# Patient Record
Sex: FEMALE | Race: WHITE
[De-identification: names, ages, dates, MRNs, and addresses within clinical notes are randomized per-mention and may not be internally consistent; named-entity substitution may affect disease eponyms.]

---

## 2019-05-29 LAB
ADD MANUAL DIFF: NO
APPEARANCE UR: (no result)
APTT PPP: YELLOW S
BARBITURATES UR QL SCN: NEGATIVE
BASOPHILS # BLD AUTO: 0 10^3/UL (ref 0–0.2)
BASOPHILS NFR BLD AUTO: 0.3 % (ref 0–2)
BILIRUB UR QL STRIP: NEGATIVE
EOSINOPHIL # BLD AUTO: 0 10^3/UL (ref 0–0.6)
EOSINOPHIL NFR BLD AUTO: 0.1 % (ref 0–6)
ERYTHROCYTE [DISTWIDTH] IN BLOOD BY AUTOMATED COUNT: 15.8 % (ref 11.5–14)
GLUCOSE UR STRIP-MCNC: NEGATIVE MG/DL
HCT VFR BLD CALC: 33.1 % (ref 36–47)
HGB BLD-MCNC: 11 G/DL (ref 12–15.5)
KETONES UR STRIP-MCNC: NEGATIVE MG/DL
LYMPHOCYTES # BLD AUTO: 1.7 10^3/UL (ref 0.5–4.7)
LYMPHOCYTES NFR BLD AUTO: 16.3 % (ref 13–45)
MCH RBC QN AUTO: 28.6 PG (ref 27–33.4)
MCHC RBC AUTO-ENTMCNC: 33.2 G/DL (ref 32–36)
MCV RBC AUTO: 86 FL (ref 80–97)
METHADONE UR QL SCN: NEGATIVE
MONOCYTES # BLD AUTO: 0.6 10^3/UL (ref 0.1–1.4)
MONOCYTES NFR BLD AUTO: 5.4 % (ref 3–13)
NEUTROPHILS # BLD AUTO: 8.1 10^3/UL (ref 1.7–8.2)
NEUTS SEG NFR BLD AUTO: 77.9 % (ref 42–78)
NITRITE UR QL STRIP: NEGATIVE
PCP UR QL SCN: NEGATIVE
PH UR STRIP: 6 [PH] (ref 5–9)
PLATELET # BLD: 320 10^3/UL (ref 150–450)
PROT UR STRIP-MCNC: NEGATIVE MG/DL
RBC # BLD AUTO: 3.83 10^6/UL (ref 3.72–5.28)
SP GR UR STRIP: 1.02
TOTAL CELLS COUNTED % (AUTO): 100 %
URINE AMPHETAMINES SCREEN: NEGATIVE
URINE BENZODIAZEPINES SCREEN: NEGATIVE
URINE COCAINE SCREEN: NEGATIVE
URINE MARIJUANA (THC) SCREEN: NEGATIVE
UROBILINOGEN UR-MCNC: NEGATIVE MG/DL (ref ?–2)
WBC # BLD AUTO: 10.4 10^3/UL (ref 4–10.5)

## 2019-05-30 ENCOUNTER — HOSPITAL ENCOUNTER (INPATIENT)
Dept: HOSPITAL 62 - 2N | Age: 19
LOS: 2 days | Discharge: HOME | End: 2019-06-01
Attending: OBSTETRICS & GYNECOLOGY | Admitting: OBSTETRICS & GYNECOLOGY
Payer: OTHER GOVERNMENT

## 2019-05-30 DIAGNOSIS — Z3A.39: ICD-10-CM

## 2019-05-30 DIAGNOSIS — O34.211: Primary | ICD-10-CM

## 2019-05-30 DIAGNOSIS — Z23: ICD-10-CM

## 2019-05-30 PROCEDURE — 80307 DRUG TEST PRSMV CHEM ANLYZR: CPT

## 2019-05-30 PROCEDURE — 85025 COMPLETE CBC W/AUTO DIFF WBC: CPT

## 2019-05-30 PROCEDURE — 81001 URINALYSIS AUTO W/SCOPE: CPT

## 2019-05-30 PROCEDURE — 86901 BLOOD TYPING SEROLOGIC RH(D): CPT

## 2019-05-30 PROCEDURE — 59025 FETAL NON-STRESS TEST: CPT

## 2019-05-30 PROCEDURE — 86900 BLOOD TYPING SEROLOGIC ABO: CPT

## 2019-05-30 PROCEDURE — 86850 RBC ANTIBODY SCREEN: CPT

## 2019-05-30 PROCEDURE — 85027 COMPLETE CBC AUTOMATED: CPT

## 2019-05-30 PROCEDURE — 90707 MMR VACCINE SC: CPT

## 2019-05-30 PROCEDURE — 36415 COLL VENOUS BLD VENIPUNCTURE: CPT

## 2019-05-30 PROCEDURE — 94799 UNLISTED PULMONARY SVC/PX: CPT

## 2019-05-30 RX ADMIN — DOCUSATE SODIUM SCH MG: 100 CAPSULE, LIQUID FILLED ORAL at 17:56

## 2019-05-30 RX ADMIN — Medication SCH: at 12:57

## 2019-05-30 RX ADMIN — KETOROLAC TROMETHAMINE SCH MG: 30 INJECTION, SOLUTION INTRAMUSCULAR at 16:31

## 2019-05-30 RX ADMIN — DOCUSATE SODIUM SCH: 100 CAPSULE, LIQUID FILLED ORAL at 12:57

## 2019-05-30 NOTE — OPERATIVE REPORT E
Operative Report



NAME: TALYA HAYWARD

MRN:  [id number]          : 2000 AGE:  18Y

DATE OF SURGERY: 2019              ROOM: 207



PREOPERATIVE DIAGNOSIS:

IUP at term with prior  section.



POSTOPERATIVE DIAGNOSIS:

IUP at term with prior  section.



OPERATION:

Repeat low-transverse  with delivery of a viable female, Apgars

of 8 and 9, weight 7 pounds 3 ounces.



SURGEON:

HARRISON MYERS M.D.



ANESTHESIA:

Spinal.



ESTIMATED BLOOD LOSS:

Less than 100 mL.



TISSUE REMOVED:

Placenta.



PROCEDURE:

The patient was placed in a supine position, rolled on her right side,

prepped and draped in the usual sterile fashion.  A Pfannenstiel incision

was made through an existing Pfannenstiel scar.  The incision was extended

through the subcutaneous tissue with sharp dissection.  Fascia were

sharply divided.  Rectus muscles were bluntly and sharply divided. 

Parietal peritoneum was entered with sharp dissection.  Uterus was nicked

in the midline and extended bilaterally.  The infant was then delivered

through the uterine-abdominal incision, cord was clamped, and the infant

was passed from the table.  The placenta was manually removed.  The uterus

was closed in 2 layers, the first a running stitch of 0 Vicryl and the

second a Lembert stitch imbricating the first layer.  Hemostasis was noted

except for one small area on the right controlled with figure-of-eight

suture of 0 Vicryl.  The fascia was closed with 0 Vicryl and the skin with

subcu absorbable staples.  The adipose tissue was approximated with

interrupted 0 Vicryl.  The patient's urine remained clear throughout the

procedure.  She was taken to the recovery room in good condition and the

infant to the  nursery in good condition.



DICTATING PHYSICIAN:  HARRISON MYERS M.D.





[id number]                  DT: 2019    [id number]

PHY#: [id number]            DD: [id number]

ID:   [id number]           JOB#: [id number]       ACCT: [id number]



cc:HARRISON MYERS M.D.

>

## 2019-05-31 LAB
ERYTHROCYTE [DISTWIDTH] IN BLOOD BY AUTOMATED COUNT: 16.5 % (ref 11.5–14)
HCT VFR BLD CALC: 30.3 % (ref 36–47)
HGB BLD-MCNC: 10.1 G/DL (ref 12–15.5)
MCH RBC QN AUTO: 29.2 PG (ref 27–33.4)
MCHC RBC AUTO-ENTMCNC: 33.5 G/DL (ref 32–36)
MCV RBC AUTO: 87 FL (ref 80–97)
PLATELET # BLD: 233 10^3/UL (ref 150–450)
RBC # BLD AUTO: 3.47 10^6/UL (ref 3.72–5.28)
WBC # BLD AUTO: 10.1 10^3/UL (ref 4–10.5)

## 2019-05-31 PROCEDURE — 3E0234Z INTRODUCTION OF SERUM, TOXOID AND VACCINE INTO MUSCLE, PERCUTANEOUS APPROACH: ICD-10-PCS | Performed by: OBSTETRICS & GYNECOLOGY

## 2019-05-31 RX ADMIN — IBUPROFEN SCH MG: 800 TABLET, FILM COATED ORAL at 08:25

## 2019-05-31 RX ADMIN — DOCUSATE SODIUM SCH MG: 100 CAPSULE, LIQUID FILLED ORAL at 17:15

## 2019-05-31 RX ADMIN — IBUPROFEN SCH MG: 800 TABLET, FILM COATED ORAL at 14:35

## 2019-05-31 RX ADMIN — DOCUSATE SODIUM SCH MG: 100 CAPSULE, LIQUID FILLED ORAL at 09:42

## 2019-05-31 RX ADMIN — Medication SCH CAP: at 09:42

## 2019-05-31 RX ADMIN — IBUPROFEN SCH MG: 800 TABLET, FILM COATED ORAL at 21:00

## 2019-05-31 RX ADMIN — KETOROLAC TROMETHAMINE SCH MG: 30 INJECTION, SOLUTION INTRAMUSCULAR at 00:03

## 2019-05-31 NOTE — PDOC DISCHARGE SUMMARY
Final Diagnosis


Discharge Date: 19





- Final Diagnosis


(1) Status post repeat low transverse  section


Is this a current diagnosis for this admission?: Yes   





Discharge Data





- Discharge Medication


Prescriptions: 


Oxycodone HCl/Acetaminophen [Percocet 5-325 mg Tablet] 2 tab PO Q4HP PRN #20 

tablet


 PRN Reason: 


Ibuprofen [Motrin 800 mg Tablet] 800 mg PO Q6A #60 tablet


Home Medications: 








Pnv No.95/Ferrous Fum/Folic AC [Prenatal Vitamin Tablet] 1 each PO DAILY 

19 


Ibuprofen [Motrin 800 mg Tablet] 800 mg PO Q6A #60 tablet 19 


Oxycodone HCl/Acetaminophen [Percocet 5-325 mg Tablet] 2 tab PO Q4HP PRN #20 

tablet 19 








Gestational Age: 39.5


Reason(s) for Admission: Ceasarean Section-Repeat


Prenatal Procedures: NST, Ultrasound


Intrapartum Procedure(s): : Low Cervical, Transverse





- Rock Data


  ** Baby 1 Female


Weight: 3.175 kg


Home with Mother: Yes


Complications: No





- Diagnosis Test


Laboratory: 


                                        











Temp Pulse Resp BP Pulse Ox


 


 98.6 F   90   18   123/58 L  97 


 


 19 07:30  19 07:30  19 07:30  19 07:30  19 07:30








                                        











  19





  11:28 11:35 06:33


 


RBC   3.83  3.47 L


 


Hgb   11.0 L  10.1 L


 


Hct   33.1 L  30.3 L


 


Urine Opiates Screen  NEGATIVE  














- Discharge information/Instructions


Discharge Activity: Activity As Tolerated, No Lifting Over 10 Pounds, No 

Lifting/Push/Pulling, No tub bath


Discharge Diet: As Tolerated, Regular


Disposition: HOME, SELF-CARE


Follow up with: [facility]


in: 1, Weeks

## 2019-05-31 NOTE — PDOC PROGRESS REPORT
Subjective-OB


Progress Note for:: 19


Subjective: 





Doing well, no c/o, pain under control, passing gas, breastfeeding, pain under 

control





Physical Exam (OB)


Vital Signs: 


                                        











Temp Pulse Resp BP Pulse Ox


 


 98.6 F   90   18   123/58 L  97 


 


 19 07:30  19 07:30  19 07:30  19 07:30  19 07:30








                                 Intake & Output











 19





 06:59 06:59 06:59


 


Intake Total  370 


 


Output Total  1750 


 


Balance  -1380 


 


Weight 85.729 kg  














- PIH/Pre-Eclampsia


DTR's: 2 +


Clonus: Negative


Headache: Absent


Epigastric Pain: No


Visual Changes: No





- 


Dressing Removed: No


Incision: Dressing





- Lochia


Lochia Amount: Scant < 10 ml


Lochia Color: Rubra/Red





- Abdomen


Description: Tender, Soft


Hernia Present: No


Fundal Description: Firm, Midline


Fundal Height: u/u - u/2





Objective-Diagnostic


Laboratory: 


                                        





                                 19 06:33 





                                        











  19





  06:33


 


WBC  10.1


 


RBC  3.47 L


 


Hgb  10.1 L


 


Hct  30.3 L


 


MCV  87


 


MCH  29.2


 


MCHC  33.5


 


RDW  16.5 H


 


Plt Count  233














Assessment and Plan(PN)





- Assessment and Plan


(1) Status post repeat low transverse  section


Is this a current diagnosis for this admission?: Yes   





- Time Spent with Patient


Time with patient: Less than 15 minutes


Medications reviewed and adjusted accordingly: Yes





- Disposition


Anticipated Discharge: Home


Within: within 24 hours

## 2019-06-01 VITALS — DIASTOLIC BLOOD PRESSURE: 79 MMHG | SYSTOLIC BLOOD PRESSURE: 132 MMHG

## 2019-06-01 RX ADMIN — Medication SCH CAP: at 09:47

## 2019-06-01 RX ADMIN — IBUPROFEN SCH MG: 800 TABLET, FILM COATED ORAL at 09:46

## 2019-06-01 RX ADMIN — DOCUSATE SODIUM SCH MG: 100 CAPSULE, LIQUID FILLED ORAL at 09:47

## 2019-06-01 RX ADMIN — IBUPROFEN SCH MG: 800 TABLET, FILM COATED ORAL at 02:21

## 2019-06-01 NOTE — PDOC PROGRESS REPORT
Subjective-OB


Progress Note for:: 19


Subjective: 





Doing well, no c/o, sitting up in chair, ready to go home, pain under control, 

no pain





Physical Exam (OB)


Vital Signs: 


                                        











Temp Pulse Resp BP Pulse Ox


 


 97.9 F   81   17   132/79 H  100 


 


 19 08:00  19 08:00  19 08:00  19 08:00  19 08:00








                                 Intake & Output











 19





 06:59 06:59 06:59


 


Intake Total 370 2425 


 


Output Total 1750  


 


Balance -1380 2425 


 


    Baby 1 Female  3.175 kg 














- PIH/Pre-Eclampsia


DTR's: 2 +


Clonus: Negative


Headache: Absent


Epigastric Pain: No


Visual Changes: No





- 


Dressing Removed: Yes - medipore dressing/tape,protective pad applied


Incision: Draining, Well Approximated





- Lochia


Lochia Amount: Scant < 10 ml


Lochia Color: Serosa/Brown





- Abdomen


Description: Soft


Hernia Present: No


Fundal Description: Firm, Midline


Fundal Height: u/u - u/2





Objective-Diagnostic


Laboratory: 


                                        





                                 19 06:33 











Assessment and Plan(PN)





- Assessment and Plan


(1) Status post repeat low transverse  section


Is this a current diagnosis for this admission?: Yes   





- Time Spent with Patient


Time with patient: Less than 15 minutes


Medications reviewed and adjusted accordingly: Yes





- Disposition


Anticipated Discharge: Home


Within: within 24 hours

## 2019-08-17 NOTE — PDOC DELIVERY SUMMARY
Delivery Summary





- Maternal


Hx : II


Hx # Term Pregnancies: 1


CINDY: 19


Gestational Age: 39+5


Prenatal Risk Factors: Previous 


Ruptured Membranes: AROM


Fluids: Clear





- Delivery


Labor: Not In Labor


Presentation: Vertex


Fetal Heart Rate Monitoring: Done Pre-Operatively, Externally


Support Person Present: Yes


: Scheduled


Number of Vessels (Cord): 3


Nuchal Cord: No





- Medications


Type of Anesthesia:: Spinal
fever, cough, rash